# Patient Record
Sex: FEMALE | URBAN - METROPOLITAN AREA
[De-identification: names, ages, dates, MRNs, and addresses within clinical notes are randomized per-mention and may not be internally consistent; named-entity substitution may affect disease eponyms.]

---

## 2021-06-17 ENCOUNTER — OFFICE VISIT (OUTPATIENT)
Dept: URBAN - METROPOLITAN AREA CLINIC 88 | Facility: CLINIC | Age: 41
End: 2021-06-17
Payer: COMMERCIAL

## 2021-06-17 DIAGNOSIS — H50.9 UNSPECIFIED STRABISMUS: ICD-10-CM

## 2021-06-17 DIAGNOSIS — E11.9 DIABETES MELLITUS TYPE 2 WITHOUT MENTION OF COMPLICATION: Primary | ICD-10-CM

## 2021-06-17 PROCEDURE — 99203 OFFICE O/P NEW LOW 30 MIN: CPT | Performed by: OPHTHALMOLOGY

## 2021-06-17 ASSESSMENT — KERATOMETRY
OS: 45.13
OD: 44.63

## 2021-06-17 ASSESSMENT — INTRAOCULAR PRESSURE
OS: 16
OD: 16

## 2021-06-17 NOTE — IMPRESSION/PLAN
Impression: Diabetes mellitus Type 2 without mention of complication: C96.2. Plan: Diabetes type II: no background retinopathy, no signs of neovascularization noted. Discussed ocular and systemic benefits of blood sugar control.

## 2022-06-20 NOTE — IMPRESSION/PLAN
Impression: Diabetes mellitus Type 2 without mention of complication: Z11.2. Plan: Reviewed with patient that no retinal vascular changes are occurring from diabetes. Patient to continue care with current medication provider for diabetes management. Importance of retinal exams reviewed. Will continue to observe with dilated examination in 12 months.

## 2023-06-20 NOTE — IMPRESSION/PLAN
Impression: Diabetes mellitus Type 2 without mention of complication: E29.9. Plan: Reviewed with patient that no retinal vascular changes are occurring from diabetes. Patient to continue care with current medication provider for diabetes management. Importance of retinal exams reviewed. Will continue to observe with dilated examination in 12 months.